# Patient Record
Sex: FEMALE | Race: WHITE | NOT HISPANIC OR LATINO | Employment: FULL TIME | URBAN - METROPOLITAN AREA
[De-identification: names, ages, dates, MRNs, and addresses within clinical notes are randomized per-mention and may not be internally consistent; named-entity substitution may affect disease eponyms.]

---

## 2017-01-04 ENCOUNTER — ALLSCRIPTS OFFICE VISIT (OUTPATIENT)
Dept: OTHER | Facility: OTHER | Age: 47
End: 2017-01-04

## 2017-02-20 ENCOUNTER — GENERIC CONVERSION - ENCOUNTER (OUTPATIENT)
Dept: OTHER | Facility: OTHER | Age: 47
End: 2017-02-20

## 2017-11-15 ENCOUNTER — ALLSCRIPTS OFFICE VISIT (OUTPATIENT)
Dept: OTHER | Facility: OTHER | Age: 47
End: 2017-11-15

## 2017-11-17 ENCOUNTER — LAB REQUISITION (OUTPATIENT)
Dept: LAB | Facility: HOSPITAL | Age: 47
End: 2017-11-17
Payer: COMMERCIAL

## 2017-11-17 DIAGNOSIS — F41.1 GENERALIZED ANXIETY DISORDER: ICD-10-CM

## 2017-11-17 DIAGNOSIS — R03.0 ELEVATED BLOOD PRESSURE READING WITHOUT DIAGNOSIS OF HYPERTENSION: ICD-10-CM

## 2017-11-17 LAB
ALBUMIN SERPL BCP-MCNC: 3.7 G/DL (ref 3.5–5)
ALP SERPL-CCNC: 61 U/L (ref 46–116)
ALT SERPL W P-5'-P-CCNC: 27 U/L (ref 12–78)
ANION GAP SERPL CALCULATED.3IONS-SCNC: 6 MMOL/L (ref 4–13)
AST SERPL W P-5'-P-CCNC: 17 U/L (ref 5–45)
BASOPHILS # BLD AUTO: 0.02 THOUSANDS/ΜL (ref 0–0.1)
BASOPHILS NFR BLD AUTO: 0 % (ref 0–1)
BILIRUB SERPL-MCNC: 0.59 MG/DL (ref 0.2–1)
BUN SERPL-MCNC: 12 MG/DL (ref 5–25)
CALCIUM SERPL-MCNC: 8.5 MG/DL (ref 8.3–10.1)
CHLORIDE SERPL-SCNC: 108 MMOL/L (ref 100–108)
CHOLEST SERPL-MCNC: 139 MG/DL (ref 50–200)
CO2 SERPL-SCNC: 27 MMOL/L (ref 21–32)
CREAT SERPL-MCNC: 0.79 MG/DL (ref 0.6–1.3)
EOSINOPHIL # BLD AUTO: 0.24 THOUSAND/ΜL (ref 0–0.61)
EOSINOPHIL NFR BLD AUTO: 4 % (ref 0–6)
ERYTHROCYTE [DISTWIDTH] IN BLOOD BY AUTOMATED COUNT: 12.5 % (ref 11.6–15.1)
GFR SERPL CREATININE-BSD FRML MDRD: 91 ML/MIN/1.73SQ M
GLUCOSE P FAST SERPL-MCNC: 75 MG/DL (ref 65–99)
HCT VFR BLD AUTO: 39.4 % (ref 34.8–46.1)
HDLC SERPL-MCNC: 49 MG/DL (ref 40–60)
HGB BLD-MCNC: 13.6 G/DL (ref 11.5–15.4)
LDLC SERPL CALC-MCNC: 80 MG/DL (ref 0–100)
LYMPHOCYTES # BLD AUTO: 1.56 THOUSANDS/ΜL (ref 0.6–4.47)
LYMPHOCYTES NFR BLD AUTO: 23 % (ref 14–44)
MCH RBC QN AUTO: 32.1 PG (ref 26.8–34.3)
MCHC RBC AUTO-ENTMCNC: 34.5 G/DL (ref 31.4–37.4)
MCV RBC AUTO: 93 FL (ref 82–98)
MONOCYTES # BLD AUTO: 0.49 THOUSAND/ΜL (ref 0.17–1.22)
MONOCYTES NFR BLD AUTO: 7 % (ref 4–12)
NEUTROPHILS # BLD AUTO: 4.38 THOUSANDS/ΜL (ref 1.85–7.62)
NEUTS SEG NFR BLD AUTO: 66 % (ref 43–75)
NRBC BLD AUTO-RTO: 0 /100 WBCS
PLATELET # BLD AUTO: 230 THOUSANDS/UL (ref 149–390)
PMV BLD AUTO: 9.4 FL (ref 8.9–12.7)
POTASSIUM SERPL-SCNC: 4.6 MMOL/L (ref 3.5–5.3)
PROT SERPL-MCNC: 6.5 G/DL (ref 6.4–8.2)
RBC # BLD AUTO: 4.24 MILLION/UL (ref 3.81–5.12)
SODIUM SERPL-SCNC: 141 MMOL/L (ref 136–145)
TRIGL SERPL-MCNC: 51 MG/DL
TSH SERPL DL<=0.05 MIU/L-ACNC: 1.06 UIU/ML (ref 0.36–3.74)
WBC # BLD AUTO: 6.7 THOUSAND/UL (ref 4.31–10.16)

## 2017-11-17 PROCEDURE — 80061 LIPID PANEL: CPT | Performed by: PHYSICIAN ASSISTANT

## 2017-11-17 PROCEDURE — 80053 COMPREHEN METABOLIC PANEL: CPT | Performed by: PHYSICIAN ASSISTANT

## 2017-11-17 PROCEDURE — 84443 ASSAY THYROID STIM HORMONE: CPT | Performed by: PHYSICIAN ASSISTANT

## 2017-11-17 PROCEDURE — 85025 COMPLETE CBC W/AUTO DIFF WBC: CPT | Performed by: PHYSICIAN ASSISTANT

## 2017-11-18 NOTE — PROGRESS NOTES
Assessment    1  Insomnia, persistent (307 42) (G47 00)   2  Generalized anxiety disorder (300 02) (F41 1)   3  Skin tag (701 9) (L91 8)   4  Elevated blood pressure reading (796 2) (R03 0)    Plan   Insomnia, persistent    · Zolpidem Tartrate 5 MG Oral Tablet (Ambien); TAKE 1 TABLET AT BEDTIME ASNEEDED FOR SLEEP  (1) CBC/PLT/DIFF; Status:Active; Requested for:70Rre4643;  Perform:[facility]; Due:95Dns2154; Ordered;   For:Elevated blood pressure reading, Generalized anxiety disorder; Ordered By:Bill Carbajal;  (1) COMPREHENSIVE METABOLIC PANEL; Status:Active; Requested for:53Kpy7938;  Perform:[facility]; Due:47Cxn5740; Ordered;   For:Elevated blood pressure reading, Generalized anxiety disorder; Ordered By:Bill Carbajal;  (1) LIPID PANEL, FASTING; Status:Active; Requested for:32Csg1890;  Perform:[facility]; Due:40Unh1216; Ordered;   For:Elevated blood pressure reading, Generalized anxiety disorder; Ordered By:Bill Carbajal;  (1) TSH; Status:Active; Requested for:33Fwo6359;  Perform:[facility]; Due:54Ljl2823; Ordered;   For:Elevated blood pressure reading, Generalized anxiety disorder; Ordered By:Seferino Carbajal;    Discussion/Summary    1  Skin tags: removed via forceps and scissorsInsomnia: discussed sleep hygiene  Most likely this is a result of her anxiety and will resolve once her anxiety is controlled  However, lack of sleep is making her anxiety worse  Prescribed Ambien 5mg to be taken as needed for sleep  She is to only take this medication if she is having difficulty sleepingGAD: not well controlled  Increasing Celexa from 10 mg to 20 mg  Advised that she see a counselor to help her with life stressors and to continue to use her coping mechanisms  Hopefully this will improve with more sleep  Elevated blood pressure reading: abnormal for this patient and she was in a state of stress during the reading  She is to take her blood pressure several days/week at home and return in 2 months  CMP, TSH, lipids orderedup in 2 months,  The patient was counseled regarding instructions for management,-- risks and benefits of treatment options  Possible side effects of new medications were reviewed with the patient/guardian today  The treatment plan was reviewed with the patient/guardian  The patient/guardian understands and agrees with the treatment plan     Self Referrals: No      Chief Complaint  skin tag removal      History of Present Illness  40 y/o female with hx BARBIE presents for skin tag removal  The skln tag has been getting caught in her bra for several weeks  It does not itch, burn, bleed or change  also has been having problems with her anxiety  Her  bought an expensive truck that she does not believe they can afford and he becomes angry when she brings it up  It is now affecting her behavior at work and this is upsetting to her  Due to anxiety, she is having difficulty sleeping  She notices it's only when she is anxious about her financial situation  She denies caffeinated beverages after 2 pm and she states she goes to bed and wakes up at approximately the same time every day  pressure was elevated today  Patient takes her blood pressure several times a week at home and reports it is usually 100/70 but she is very anxious today  Review of Systems   Constitutional: feeling tired, but-- no fever,-- not feeling poorly-- and-- no chills  Cardiovascular: No complaints of slow heart rate, no fast heart rate, no chest pain, no palpitations, no leg claudication, no lower extremity edema  Respiratory: No complaints of shortness of breath, no wheezing, no cough, no SOB on exertion, no orthopnea, no PND  Gastrointestinal: No complaints of abdominal pain, no constipation, no nausea or vomiting, no diarrhea, no bloody stools  Musculoskeletal: No complaints of arthralgias, no myalgias, no joint swelling or stiffness, no limb pain or swelling    Integumentary: skin lesion, but-- as noted in HPI,-- no rashes,-- no itching-- and-- no skin wound  Neurological: No complaints of headache, no confusion, no convulsions, no numbness, no dizziness or fainting, no tingling, no limb weakness, no difficulty walking  Psychiatric: anxiety-- and-- sleep disturbances, but-- not suicidal-- and-- no depression  Endocrine: No complaints of proptosis, no hot flashes, no muscle weakness, no deepening of the voice, no feelings of weakness  ROS reviewed  Active Problems  1  Generalized anxiety disorder (300 02) (F41 1)   2  Palpitations (785 1) (R00 2)   3  Shoulder pain (719 41) (M25 519)    Past Medical History  1  History of Gout, arthritis (274 00) (M10 9)   2  History of screening mammography (V15 89) (Z92 89)    The active problems and past medical history were reviewed and updated today  Surgical History  1  Denied: History Of Prior Surgery    The surgical history was reviewed and updated today  Family History  Mother    1  Family history of alcoholism (V17 0) (Z81 1)   2  Family history of depression (V17 0) (Z81 8)  Father    3  Family history of cardiac disorder (V17 49) (Z82 49)   4  Family history of hypertension (V17 49) (Z82 49)  Maternal Grandfather    5  Family history of lung cancer (V16 1) (Z80 1)    The family history was reviewed and updated today  Social History     · Current every day smoker (305 1) (F17 200)   · Lives with spouse   ·    · Occupation   · Primary spoken language English  The social history was reviewed and updated today  The social history was reviewed and is unchanged  Current Meds   1  Citalopram Hydrobromide 20 MG Oral Tablet; Therapy: (Recorded:27Dfd4376) to Recorded   2  Cyclobenzaprine HCl - 5 MG Oral Tablet; TAKE 1 TABLET AT BEDTIME; Therapy: 09MUM6356 to (Evaluate:97Wfr4570)  Requested for: 75TYV9123; Last Rx:04Jan2017 Ordered    The medication list was reviewed and updated today  Allergies  1   No Known Drug Allergies    Vitals  Vital Signs    Recorded: 50TWO6386 05:02PM Systolic 014   Diastolic 82   Height 5 ft 6 in       Physical Exam   Constitutional  General appearance: No acute distress, well appearing and well nourished  Eyes  Conjunctiva and lids: No swelling, erythema or discharge  Pupils and irises: Equal, round and reactive to light  Pulmonary  Respiratory effort: No increased work of breathing or signs of respiratory distress  Auscultation of lungs: Clear to auscultation  Cardiovascular  Auscultation of heart: Normal rate and rhythm, normal S1 and S2, without murmurs  Lymphatic  Palpation of lymph nodes in neck: No lymphadenopathy  Skin  Examination of the skin for lesions: Abnormal  -- skin tage RUQ  Neurologic  Cranial nerves: Cranial nerves 2-12 intact  Psychiatric  Orientation to person, place, and time: Normal    Mood and affect: Abnormal  -- energetic and anxious  Future Appointments    Date/Time Provider Specialty Site   01/17/2018 05:30 PM RANDALL Durán   [address] [facility]       Signatures   Electronically signed by : Pérez Centeno [facility]; Nov 15 2017  6:28PM EST                       (Author)    Electronically signed by : RANDALL Douglas ; Nov 17 2017 11:45AM EST                       (Author)

## 2017-11-21 ENCOUNTER — GENERIC CONVERSION - ENCOUNTER (OUTPATIENT)
Dept: OTHER | Facility: OTHER | Age: 47
End: 2017-11-21

## 2018-01-10 NOTE — RESULT NOTES
Verified Results  (1) CBC/PLT/DIFF [id number] 04:07PM [facility]     Test Name Result Flag Reference   WBC COUNT 6 70 Thousand/uL  4 31-10 16   RBC COUNT 4 24 Million/uL  3 81-5 12   HEMOGLOBIN 13 6 g/dL  11 5-15 4   HEMATOCRIT 39 4 %  34 8-46  1   MCV 93 fL  82-98   MCH 32 1 pg  26 8-34 3   MCHC 34 5 g/dL  31 4-37 4   RDW 12 5 %  11 6-15 1   MPV 9 4 fL  8 9-12 7   PLATELET COUNT 838 Thousands/uL  149-390   nRBC AUTOMATED 0 /100 WBCs     NEUTROPHILS RELATIVE PERCENT 66 %  43-75   LYMPHOCYTES RELATIVE PERCENT 23 %  14-44   MONOCYTES RELATIVE PERCENT 7 %  4-12   EOSINOPHILS RELATIVE PERCENT 4 %  0-6   BASOPHILS RELATIVE PERCENT 0 %  0-1   NEUTROPHILS ABSOLUTE COUNT 4 38 Thousands/? ??L  1 85-7 62   LYMPHOCYTES ABSOLUTE COUNT 1 56 Thousands/? ??L  0 60-4 47   MONOCYTES ABSOLUTE COUNT 0 49 Thousand/? ??L  0 17-1 22   EOSINOPHILS ABSOLUTE COUNT 0 24 Thousand/? ??L  0 00-0 61   BASOPHILS ABSOLUTE COUNT 0 02 Thousands/? ??L  0 00-0 10   This is a patient instruction: This test is non-fasting  Please drink two glasses of water morning of bloodwork  (1) COMPREHENSIVE METABOLIC PANEL 88DCN1525 25:62XO [facility]     Test Name Result Flag Reference   SODIUM 141 mmol/L  136-145   POTASSIUM 4 6 mmol/L  3 5-5 3   CHLORIDE 108 mmol/L  100-108   CARBON DIOXIDE 27 mmol/L  21-32   ANION GAP (CALC) 6 mmol/L  4-13   BLOOD UREA NITROGEN 12 mg/dL  5-25   CREATININE 0 79 mg/dL  0 60-1 30   Standardized to IDMS reference method   CALCIUM 8 5 mg/dL  8 3-10 1   BILI, TOTAL 0 59 mg/dL  0 20-1 00   ALK PHOSPHATAS 61 U/L     ALT (SGPT) 27 U/L  12-78   Specimen collection should occur prior to Sulfasalazine and/or Sulfapyridine administration due to the potential for falsely depressed results  AST(SGOT) 17 U/L  5-45   Specimen collection should occur prior to Sulfasalazine administration due to the potential for falsely depressed results     ALBUMIN 3 7 g/dL  3 5-5 0   TOTAL PROTEIN 6 5 g/dL  6 4-8 2   eGFR 91 ml/min/1 73sq m National Kidney Disease Education Program recommendations are as follows:  GFR calculation is accurate only with a steady state creatinine  Chronic Kidney disease less than 60 ml/min/1 73 sq  meters  Kidney failure less than 15 ml/min/1 73 sq  meters  GLUCOSE FASTING 75 mg/dL  65-99   Specimen collection should occur prior to Sulfasalazine administration due to the potential for falsely depressed results  Specimen collection should occur prior to Sulfapyridine administration due to the potential for falsely elevated results  (1) TSH [id number] 04:07PM [facility]     Test Name Result Flag Reference   TSH 1 060 uIU/mL  0 358-3 740   This is a patient instruction: This test is non-fasting  Please drink two glasses of water morning of bloodwork  Patients undergoing fluorescein dye angiography may retain small amounts of fluorescein in the body for 48-72 hours post procedure  Samples containing fluorescein can produce falsely depressed TSH values  If the patient had this procedure,a specimen should be resubmitted post fluorescein clearance  The recommended reference ranges for TSH during pregnancy are as follows:  First trimester 0 1 to 2 5 uIU/mL  Second trimester  0 2 to 3 0 uIU/mL  Third trimester 0 3 to 3 0 uIU/m     (1) LIPID PANEL FASTING W DIRECT LDL REFLEX [id number] 04:07PM [facility]     Test Name Result Flag Reference   CHOLESTEROL 139 mg/dL     LDL CHOLESTEROL CALCULATED 80 mg/dL  0-100   This is a patient instruction:  This is a fasting test  Water, black tea or black coffee only after 9:00pm the night before the test  Drink 2 glasses of water the morning of the test       Triglyceride:        Normal <150 mg/dl   Borderline High 150-199 mg/dl   High 200-499 mg/dl   Very High >499 mg/dl      Cholesterol:       Desirable <200 mg/dl    Borderline High 200-239 mg/dl    High >239 mg/dl      HDL Cholesterol:       High>59 mg/dL    Low <41 mg/dL      HDL Cholesterol:       High>59 mg/dL    Low <41 mg/dL      This screening LDL is a calculated result  It does not have the accuracy of the Direct Measured LDL in the monitoring of patients with hyperlipidemia and/or statin therapy  Direct Measure LDL (YEZ387) must be ordered separately in these patients  TRIGLYCERIDES 51 mg/dL  <=150   Specimen collection should occur prior to N-Acetylcysteine or Metamizole administration due to the potential for falsely depressed results  HDL,DIRECT 49 mg/dL  40-60   Specimen collection should occur prior to Metamizole administration due to the potential for falsley depressed results

## 2018-01-12 VITALS
RESPIRATION RATE: 12 BRPM | SYSTOLIC BLOOD PRESSURE: 124 MMHG | DIASTOLIC BLOOD PRESSURE: 88 MMHG | WEIGHT: 152 LBS | BODY MASS INDEX: 24.43 KG/M2 | HEIGHT: 66 IN

## 2018-01-14 VITALS — HEIGHT: 66 IN | SYSTOLIC BLOOD PRESSURE: 142 MMHG | DIASTOLIC BLOOD PRESSURE: 82 MMHG

## 2018-01-16 ENCOUNTER — ALLSCRIPTS OFFICE VISIT (OUTPATIENT)
Dept: OTHER | Facility: OTHER | Age: 48
End: 2018-01-16

## 2018-01-18 NOTE — PROGRESS NOTES
Assessment   1  Elevated blood pressure reading (796 2) (R03 0)   2  Generalized anxiety disorder (300 02) (F41 1)    Discussion/Summary      1  Elevated blood pressure -resolved  The elevation appears to be stress related Generalized anxiety -much improved with increased dose of citalopram at 20 mg  she is happy with the current control of her anxiety  No change in management  in 6 months  The patient was counseled regarding instructions for management,-- impressions,-- risks and benefits of treatment options  Possible side effects of new medications were reviewed with the patient/guardian today  The treatment plan was reviewed with the patient/guardian  The patient/guardian understands and agrees with the treatment plan       Self Referrals: No      Chief Complaint   Follow up for blood pressure check  History of Present Illness   44-year-old female presents for follow-up of anxiety as well as elevated blood pressure reading  At last visit her blood pressure was 142/82, however today in the office was 102/68  She denies any lightheadedness, chest pain, headache, vision changes  At the time she had a very high stress level and this was the most likely cause of her elevated blood pressure  citalopram was increased from 10-20 mg at the last visit  She reports that her mood is much improved  She denies difficulty concentrating, poor appetite, suicidal ideation  She has been using melatonin when she is unable to sleep with relief      Review of Systems        Constitutional: No fever, no chills, feels well, no tiredness, no recent weight gain or weight loss  Eyes: no eyesight problems  Cardiovascular: No complaints of slow heart rate, no fast heart rate, no chest pain, no palpitations, no leg claudication, no lower extremity edema  Respiratory: No complaints of shortness of breath, no wheezing, no cough, no SOB on exertion, no orthopnea, no PND        Gastrointestinal: No complaints of abdominal pain, no constipation, no nausea or vomiting, no diarrhea, no bloody stools  Musculoskeletal: No complaints of arthralgias, no myalgias, no joint swelling or stiffness, no limb pain or swelling  Integumentary: No complaints of skin rash or lesions, no itching, no skin wounds, no breast pain or lump  Neurological: No complaints of headache, no confusion, no convulsions, no numbness, no dizziness or fainting, no tingling, no limb weakness, no difficulty walking  Psychiatric: anxiety, but-- not suicidal,-- no sleep disturbances-- and-- no depression  ROS reviewed  Active Problems   1  Elevated blood pressure reading (796 2) (R03 0)   2  Generalized anxiety disorder (300 02) (F41 1)   3  Insomnia, persistent (307 42) (G47 00)   4  Nasal congestion (478 19) (R09 81)   5  Palpitations (785 1) (R00 2)   6  Shoulder pain (719 41) (M25 519)   7  Skin tag (701 9) (L91 8)    Past Medical History   1  History of Gout, arthritis (274 00) (M10 9)   2  History of screening mammography (V15 89) (Z92 89)     The active problems and past medical history were reviewed and updated today  Surgical History   1  Denied: History Of Prior Surgery     The surgical history was reviewed and updated today  Family History   Mother    1  Family history of alcoholism (V17 0) (Z81 1)   2  Family history of depression (V17 0) (Z81 8)  Father    3  Family history of cardiac disorder (V17 49) (Z82 49)   4  Family history of hypertension (V17 49) (Z82 49)  Maternal Grandfather    5  Family history of lung cancer (V16 1) (Z80 1)     The family history was reviewed and updated today  Social History    · Current every day smoker (305 1) (F17 200)   · Lives with spouse   ·    · Occupation   · Primary spoken language English  The social history was reviewed and updated today  The social history was reviewed and is unchanged  Current Meds    1   Citalopram Hydrobromide 20 MG Oral Tablet; Therapy: (Recorded:49Jxn2988) to Recorded   2  Fluticasone Propionate 50 MCG/ACT Nasal Suspension; Two sprays in each nostril     once daily; Therapy: 81OND9981 to (Last Rx:49Bmb7442)  Requested for: 05Aiq9335 Ordered     The medication list was reviewed and updated today  Allergies   1  No Known Drug Allergies    Vitals   Vital Signs    Recorded: 77IWG9713 05:10PM   Heart Rate 82   Systolic 924, LUE, Sitting   Diastolic 68, LUE, Sitting   Height 5 ft 5 in   Weight 161 lb    BMI Calculated 26 79   BSA Calculated 1 8   O2 Saturation 98     Physical Exam        Constitutional      General appearance: No acute distress, well appearing and well nourished  Pulmonary      Respiratory effort: No increased work of breathing or signs of respiratory distress  Auscultation of lungs: Clear to auscultation  Cardiovascular      Auscultation of heart: Normal rate and rhythm, normal S1 and S2, without murmurs  Examination of extremities for edema and/or varicosities: Normal        Psychiatric      Mood and affect: Normal           Signatures    Electronically signed by : MELANIE Feng; Jan 16 2018  6:12PM EST                       (Author)     Electronically signed by :  RANDALL Chang ; Jan 17 2018  9:16AM EST

## 2018-01-23 VITALS
OXYGEN SATURATION: 98 % | BODY MASS INDEX: 26.82 KG/M2 | HEART RATE: 82 BPM | WEIGHT: 161 LBS | DIASTOLIC BLOOD PRESSURE: 68 MMHG | SYSTOLIC BLOOD PRESSURE: 102 MMHG | HEIGHT: 65 IN

## 2018-02-20 DIAGNOSIS — F41.8 ANXIETY ASSOCIATED WITH DEPRESSION: Primary | ICD-10-CM

## 2018-02-20 RX ORDER — CITALOPRAM 20 MG/1
1 TABLET ORAL DAILY
COMMUNITY
End: 2018-02-20 | Stop reason: SDUPTHER

## 2018-02-20 RX ORDER — CITALOPRAM 20 MG/1
20 TABLET ORAL DAILY
Qty: 90 TABLET | Refills: 1 | Status: SHIPPED | OUTPATIENT
Start: 2018-02-20 | End: 2018-08-25 | Stop reason: SDUPTHER

## 2018-07-17 PROBLEM — F41.1 GENERALIZED ANXIETY DISORDER: Status: ACTIVE | Noted: 2017-01-04

## 2018-07-17 PROBLEM — R03.0 ELEVATED BLOOD PRESSURE READING: Status: ACTIVE | Noted: 2017-11-15

## 2018-07-17 PROBLEM — R03.0 ELEVATED BLOOD PRESSURE READING: Status: RESOLVED | Noted: 2017-11-15 | Resolved: 2018-07-17

## 2018-07-17 PROBLEM — G47.00 INSOMNIA, PERSISTENT: Status: ACTIVE | Noted: 2017-11-15

## 2018-08-13 ENCOUNTER — OFFICE VISIT (OUTPATIENT)
Dept: FAMILY MEDICINE CLINIC | Facility: CLINIC | Age: 48
End: 2018-08-13
Payer: COMMERCIAL

## 2018-08-13 VITALS
WEIGHT: 158 LBS | HEIGHT: 66 IN | RESPIRATION RATE: 18 BRPM | DIASTOLIC BLOOD PRESSURE: 70 MMHG | OXYGEN SATURATION: 96 % | BODY MASS INDEX: 25.39 KG/M2 | SYSTOLIC BLOOD PRESSURE: 115 MMHG | TEMPERATURE: 98.2 F | HEART RATE: 107 BPM

## 2018-08-13 DIAGNOSIS — F51.05 INSOMNIA DUE TO OTHER MENTAL DISORDER: ICD-10-CM

## 2018-08-13 DIAGNOSIS — F41.1 GENERALIZED ANXIETY DISORDER: ICD-10-CM

## 2018-08-13 DIAGNOSIS — F99 INSOMNIA DUE TO OTHER MENTAL DISORDER: ICD-10-CM

## 2018-08-13 DIAGNOSIS — J02.9 PHARYNGITIS, UNSPECIFIED ETIOLOGY: Primary | ICD-10-CM

## 2018-08-13 PROCEDURE — 99214 OFFICE O/P EST MOD 30 MIN: CPT | Performed by: PHYSICIAN ASSISTANT

## 2018-08-13 PROCEDURE — 3008F BODY MASS INDEX DOCD: CPT | Performed by: PHYSICIAN ASSISTANT

## 2018-08-13 RX ORDER — ZOLPIDEM TARTRATE 10 MG/1
10 TABLET ORAL
Qty: 30 TABLET | Refills: 0 | Status: SHIPPED | OUTPATIENT
Start: 2018-08-13

## 2018-08-13 RX ORDER — AMOXICILLIN 875 MG/1
875 TABLET, COATED ORAL 2 TIMES DAILY
Qty: 20 TABLET | Refills: 0 | Status: SHIPPED | OUTPATIENT
Start: 2018-08-13 | End: 2018-08-23

## 2018-08-13 NOTE — ASSESSMENT & PLAN NOTE
Discussed sleep hygiene and the risk of dependence on sleep medications   Pt reciprocates understanding and will only use sleep medication as needed

## 2018-08-13 NOTE — PROGRESS NOTES
Assessment/Plan:    Pharyngitis  Will tx with amoxicillin based on hx and PE  Return if sx worsen or fail to improve    Generalized anxiety disorder  Pt stable despite increased stressors with the exception of sleep disturbance  Continue citalopram 20 mg daily and will add ambien as needed for sleep    Insomnia due to other mental disorder  Discussed sleep hygiene and the risk of dependence on sleep medications  Pt reciprocates understanding and will only use sleep medication as needed       Diagnoses and all orders for this visit:    Pharyngitis, unspecified etiology  -     amoxicillin (AMOXIL) 875 mg tablet; Take 1 tablet (875 mg total) by mouth 2 (two) times a day for 10 days    Insomnia due to other mental disorder  -     zolpidem (AMBIEN) 10 mg tablet; Take 1 tablet (10 mg total) by mouth daily at bedtime as needed for sleep    Generalized anxiety disorder          Subjective:      Patient ID: Gracy Choe is a 57 y o  female  65-year-old female presents complaining of sore throat for 1 week as well as anxiety issues with insomnia  Reports her sore throat is not improving after 1 week  Feels like it is burning  Also notes swollen, tender lymph nodes  Feeling feverish with chills  Daughter and co-worker were diagnosed with strep throat recently  No nasal congestion, sinus pressure, ear pain, cough, shortness of breath  Has been using Advil 600 mg as well as throat lozenges without much relief  Pt also reports stress at home   and her have been fighting mainly due to financial issues   has made comments to their kids about the marriage coming to an end which upsets her  No longer sleeping together  Has been taking citalopram 20 mg daily which has been helping her cope  She is still functioning well at her job, eating, denies fatigue  Denies SI, but is having problems sleeping which is disturbing for her  Difficulty falling asleep not staying asleep   Seeing a counselor which has been helping greatly but his appointments are during the day when she is at work and is considering FMLA so that she may make her appointments more regularly  The following portions of the patient's history were reviewed and updated as appropriate: allergies, current medications, past family history, past medical history, past social history, past surgical history and problem list     Review of Systems   Constitutional: Positive for appetite change, chills, fatigue and fever  HENT: Positive for sore throat  Negative for congestion, ear pain, facial swelling, hearing loss, postnasal drip, rhinorrhea, sinus pain, sinus pressure and voice change  Eyes: Negative for pain, redness and visual disturbance  Respiratory: Negative for cough, chest tightness, shortness of breath and wheezing  Cardiovascular: Negative for chest pain, palpitations and leg swelling  Gastrointestinal: Negative for abdominal pain, constipation, diarrhea, nausea and vomiting  Genitourinary: Negative for difficulty urinating, dysuria and frequency  Musculoskeletal: Negative for myalgias  Skin: Negative for color change, pallor and wound  Neurological: Negative for dizziness, syncope, numbness and headaches  Hematological: Negative for adenopathy  Psychiatric/Behavioral: Positive for dysphoric mood and sleep disturbance  Negative for decreased concentration and suicidal ideas  The patient is nervous/anxious  Objective:      /70   Pulse (!) 107   Temp 98 2 °F (36 8 °C)   Resp 18   Ht 5' 6" (1 676 m)   Wt 71 7 kg (158 lb)   SpO2 96%   BMI 25 50 kg/m²          Physical Exam   Constitutional: She is oriented to person, place, and time  She appears well-developed and well-nourished  No distress  HENT:   Head: Normocephalic and atraumatic  Right Ear: Hearing, tympanic membrane, external ear and ear canal normal  No tenderness  No middle ear effusion  No decreased hearing is noted     Left Ear: Hearing, tympanic membrane, external ear and ear canal normal    Nose: Mucosal edema and rhinorrhea present  Right sinus exhibits no maxillary sinus tenderness and no frontal sinus tenderness  Left sinus exhibits no maxillary sinus tenderness and no frontal sinus tenderness  Mouth/Throat: Mucous membranes are normal  No uvula swelling  Posterior oropharyngeal erythema present  No oropharyngeal exudate, posterior oropharyngeal edema or tonsillar abscesses  Eyes: Conjunctivae and EOM are normal  Pupils are equal, round, and reactive to light  Right eye exhibits no discharge  Left eye exhibits no discharge  No scleral icterus  Neck: Normal range of motion  Neck supple  Cardiovascular: Normal rate, regular rhythm and normal heart sounds  Exam reveals no gallop and no friction rub  No murmur heard  Pulmonary/Chest: Effort normal and breath sounds normal  No respiratory distress  She has no wheezes  She has no rales  Musculoskeletal: Normal range of motion  Lymphadenopathy:     She has cervical adenopathy  Neurological: She is alert and oriented to person, place, and time  No cranial nerve deficit  Skin: Skin is warm and dry  No rash noted  She is not diaphoretic  No erythema  No pallor

## 2018-08-13 NOTE — ASSESSMENT & PLAN NOTE
Pt stable despite increased stressors with the exception of sleep disturbance   Continue citalopram 20 mg daily and will add ambien as needed for sleep

## 2018-08-20 ENCOUNTER — TELEPHONE (OUTPATIENT)
Dept: FAMILY MEDICINE CLINIC | Facility: CLINIC | Age: 48
End: 2018-08-20

## 2018-08-20 DIAGNOSIS — B37.3 VAGINAL CANDIDIASIS: Primary | ICD-10-CM

## 2018-08-20 RX ORDER — FLUCONAZOLE 150 MG/1
150 TABLET ORAL ONCE
Qty: 1 TABLET | Refills: 0 | Status: SHIPPED | OUTPATIENT
Start: 2018-08-20 | End: 2018-08-20

## 2018-08-22 ENCOUNTER — TELEPHONE (OUTPATIENT)
Dept: FAMILY MEDICINE CLINIC | Facility: CLINIC | Age: 48
End: 2018-08-22

## 2018-08-22 DIAGNOSIS — B37.3 VAGINAL CANDIDIASIS: Primary | ICD-10-CM

## 2018-08-22 RX ORDER — FLUCONAZOLE 150 MG/1
150 TABLET ORAL ONCE
Qty: 1 TABLET | Refills: 0 | Status: SHIPPED | OUTPATIENT
Start: 2018-08-22 | End: 2018-08-22

## 2018-08-22 NOTE — TELEPHONE ENCOUNTER
Patient called stating that she has her yeast infection, she would like another script called in for her Diflucan to [id number] [facility]  Thanks

## 2018-08-25 DIAGNOSIS — F41.8 ANXIETY ASSOCIATED WITH DEPRESSION: ICD-10-CM

## 2018-08-26 RX ORDER — CITALOPRAM 20 MG/1
TABLET ORAL
Qty: 90 TABLET | Refills: 3 | Status: SHIPPED | OUTPATIENT
Start: 2018-08-26 | End: 2019-12-13 | Stop reason: SDUPTHER

## 2019-12-13 DIAGNOSIS — F41.8 ANXIETY ASSOCIATED WITH DEPRESSION: ICD-10-CM

## 2019-12-17 RX ORDER — CITALOPRAM 20 MG/1
20 TABLET ORAL DAILY
Qty: 30 TABLET | Refills: 0 | Status: SHIPPED | OUTPATIENT
Start: 2019-12-17

## 2020-01-09 DIAGNOSIS — F41.8 ANXIETY ASSOCIATED WITH DEPRESSION: ICD-10-CM

## 2020-01-09 RX ORDER — CITALOPRAM 20 MG/1
TABLET ORAL
Qty: 30 TABLET | Refills: 0 | OUTPATIENT
Start: 2020-01-09